# Patient Record
Sex: MALE | Race: WHITE | Employment: UNEMPLOYED | URBAN - NONMETROPOLITAN AREA
[De-identification: names, ages, dates, MRNs, and addresses within clinical notes are randomized per-mention and may not be internally consistent; named-entity substitution may affect disease eponyms.]

---
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
## 2024-09-26 ENCOUNTER — OFFICE VISIT (OUTPATIENT)
Dept: FAMILY MEDICINE | Facility: OTHER | Age: 19
End: 2024-09-26
Payer: COMMERCIAL

## 2024-09-26 VITALS
SYSTOLIC BLOOD PRESSURE: 108 MMHG | OXYGEN SATURATION: 99 % | HEIGHT: 68 IN | HEART RATE: 73 BPM | TEMPERATURE: 97.9 F | BODY MASS INDEX: 20.88 KG/M2 | WEIGHT: 137.8 LBS | RESPIRATION RATE: 18 BRPM | DIASTOLIC BLOOD PRESSURE: 68 MMHG

## 2024-09-26 DIAGNOSIS — J02.9 SORE THROAT: ICD-10-CM

## 2024-09-26 DIAGNOSIS — R05.2 SUBACUTE COUGH: ICD-10-CM

## 2024-09-26 DIAGNOSIS — J06.9 VIRAL URI WITH COUGH: Primary | ICD-10-CM

## 2024-09-26 LAB — GROUP A STREP BY PCR: NOT DETECTED

## 2024-09-26 PROCEDURE — 87651 STREP A DNA AMP PROBE: CPT | Mod: ZL

## 2024-09-26 PROCEDURE — 99203 OFFICE O/P NEW LOW 30 MIN: CPT

## 2024-09-26 PROCEDURE — G0463 HOSPITAL OUTPT CLINIC VISIT: HCPCS

## 2024-09-26 RX ORDER — BENZONATATE 100 MG/1
100 CAPSULE ORAL 3 TIMES DAILY PRN
Qty: 30 CAPSULE | Refills: 0 | Status: SHIPPED | OUTPATIENT
Start: 2024-09-26

## 2024-09-26 ASSESSMENT — PAIN SCALES - GENERAL: PAINLEVEL: NO PAIN (0)

## 2024-09-26 NOTE — PROGRESS NOTES
ASSESSMENT/PLAN:    I have reviewed the nursing notes.  I have reviewed the findings, diagnosis, plan and need for follow up with the patient.    1. Viral URI with cough  2. Sore throat  3. Subacute cough  - benzonatate (TESSALON) 100 MG capsule; Take 1 capsule (100 mg) by mouth 3 times daily as needed for cough.  Dispense: 30 capsule; Refill: 0  - Group A Streptococcus PCR Throat Swab    Patient presents with upper respiratory symptoms that are improving.  Patient's vitals are stable and he appears nontoxic.  Strep test was negative. Discussed with patient that symptoms and exam are consistent with viral illness.  Discussed that symptomatic treatment of cough is appropriate but not with antibiotics.  Will treat patient's cough with Tessalon Perles as needed.  Discussed symptomatic treatment - Encouraged fluids, salt water gargles, honey, elevation, humidifier, sinus rinse/netti pot, lozenges, tea, topical vapor rub, popsicles, rest, etc. May use over-the-counter Tylenol or ibuprofen PRN.    Discussed warning signs/symptoms indicative of need to f/u    Follow up if symptoms persist or worsen or concerns    I explained my diagnostic considerations and recommendations to the patient, who voiced understanding and agreement with the treatment plan. All questions were answered. We discussed potential side effects of any prescribed or recommended therapies, as well as expectations for response to treatments.    Chencho Costa, SANTOS CNP  9/26/2024  11:31 AM    HPI:    Yuan Miller is a 19 year old male accompanied by his sister who presents [facility] for concerns of URI symptoms    URI, x 3 weeks    Symptoms:  YES: +  fevers or chills. Fever, highest reported temperature: 99.9 F  YES: +  sore throat/pharyngitis/tonsillitis.   YES: +  allergy/URI Symptoms  YES: +  muffled sounds/change in hearing  YES: +  sensation of fullness in ear(s)  No ringing in ears/tinnitus  No balance changes  No dizziness  No  "congestion (head/nasal/chest)  YES: +  cough/productive cough  No post nasal drip   No headache - had but resolved  No sinus pain/pressure  No myalgias - had but resolved  No otalgia  No rash  Activity Level Changes: No  Appetite/Liquid Intake Changes: No  Changes to Bowel Habits: No  Changes to Bladder Habits: No  Additional Symptoms to Report: No  History of similar symptoms: No  Prior workup: No    Treatments tried: Fluids and Rest    Site of exposure: not known.  Type of exposure: not known    Other Pertinent History: none    Allergies: NKA    PCP: Melvin    History reviewed. No pertinent past medical history.  History reviewed. No pertinent surgical history.  Social History     Tobacco Use    Smoking status: Never    Smokeless tobacco: Never   Substance Use Topics    Alcohol use: Never     Current Outpatient Medications   Medication Sig Dispense Refill    buPROPion (WELLBUTRIN XL) 150 MG 24 hr tablet Take 1 tablet (150 mg) by mouth every morning (Patient not taking: Reported on 9/26/2024)      FLUoxetine (PROZAC) 20 MG capsule Take 1 capsule (20 mg) by mouth daily (Patient not taking: Reported on 9/26/2024)      methylphenidate (CONCERTA) 54 MG CR tablet Take 1 tablet (54 mg) by mouth every morning (Patient not taking: Reported on 9/26/2024)       No Known Allergies  Past medical history, past surgical history, current medications and allergies reviewed and accurate to the best of my knowledge.      ROS:  Refer to HPI    /68 (BP Location: Left arm, Patient Position: Sitting, Cuff Size: Adult Regular)   Pulse 73   Temp 97.9  F (36.6  C) (Temporal)   Resp 18   Ht 1.715 m (5' 7.5\")   Wt 62.5 kg (137 lb 12.8 oz)   SpO2 99%   BMI 21.26 kg/m      EXAM:  General Appearance: Well appearing 19 year old male, appropriate appearance for age. No acute distress   Ears: Left TM intact, translucent with bony landmarks appreciated, no erythema, mild effusion and bulging, no purulence.  Right TM intact, " translucent with bony landmarks appreciated, no erythema, mild effusion and bulging, no purulence.  Left auditory canal clear.  Right auditory canal clear.  Normal external ears, non tender.  Eyes: conjunctivae normal without erythema or irritation, corneas clear, no drainage or crusting, no eyelid swelling, pupils equal   Oropharynx: moist mucous membranes, posterior pharynx without erythema, tonsils symmetric and 1+, no erythema, no exudates or petechiae, no post nasal drip seen, no trismus, voice clear.    Sinuses:  No sinus tenderness upon palpation of the frontal or maxillary sinuses  Nose:  Bilateral nares: no erythema, no edema, no drainage or congestion   Neck: supple without adenopathy  Respiratory: normal chest wall and respirations.  Normal effort.  Clear to auscultation bilaterally, no wheezing, crackles or rhonchi.  No increased work of breathing.  No cough appreciated.  Cardiac: RRR with no murmurs  Musculoskeletal:  Equal movement of bilateral upper extremities.  Equal movement of bilateral lower extremities.  Normal gait.    Dermatological: no rashes noted of exposed skin  Neuro: Alert and oriented to person, place, and time.    Psychological: normal affect, alert, oriented, and pleasant.     Labs:  Results for orders placed or performed in visit on 09/26/24   Group A Streptococcus PCR Throat Swab     Status: Normal    Specimen: Throat; Swab   Result Value Ref Range    Group A strep by PCR Not Detected Not Detected    Narrative    The Xpert Xpress Strep A test, performed on the [employer], is a rapid, qualitative in vitro diagnostic test for the detection of Streptococcus pyogenes (Group A ß-hemolytic Streptococcus, Strep A) in throat swab specimens from patients with signs and symptoms of pharyngitis. The Xpert Xpress Strep A test can be used as an aid in the diagnosis of Group A Streptococcal pharyngitis. The assay is not intended to monitor treatment for Group A Streptococcus  infections. The Xpert Xpress Strep A test utilizes an automated real-time polymerase chain reaction (PCR) to detect Streptococcus pyogenes DNA.

## 2024-09-26 NOTE — PROGRESS NOTES
"Chief Complaint   Patient presents with    Cough    Throat Problem     [facility] concerns of a cough, dry throat and having a time hard hearing. Patient states his cough has been going on for about 3 weeks. Patient states that he has been having a hard time hearing for about 2 days.       Initial /68 (BP Location: Left arm, Patient Position: Sitting, Cuff Size: Adult Regular)   Pulse 73   Temp 97.9  F (36.6  C) (Temporal)   Resp 18   Ht 1.715 m (5' 7.5\")   Wt 62.5 kg (137 lb 12.8 oz)   SpO2 99%   BMI 21.26 kg/m   Estimated body mass index is 21.26 kg/m  as calculated from the following:    Height as of this encounter: 1.715 m (5' 7.5\").    Weight as of this encounter: 62.5 kg (137 lb 12.8 oz).     FOOD SECURITY SCREENING QUESTIONS:    The next two questions are to help us understand your food security.  If you are feeling you need any assistance in this area, we have resources available to support you today.    Hunger Vital Signs:  Within the past 12 months we worried whether our food would run out before we got money to buy more. Never  Within the past 12 months the food we bought just didn't last and we didn't have money to get more. Never      Samuel Bruno   "

## (undated) RX ORDER — LIDOCAINE HYDROCHLORIDE 10 MG/ML
INJECTION, SOLUTION INFILTRATION; PERINEURAL
Status: DISPENSED
Start: 2020-06-11

## (undated) RX ORDER — LIDOCAINE HYDROCHLORIDE 10 MG/ML
INJECTION, SOLUTION INFILTRATION; PERINEURAL
Status: DISPENSED
Start: 2019-09-19

## (undated) RX ORDER — NEOMYCIN/BACITRACIN/POLYMYXINB 3.5-400-5K
OINTMENT (GRAM) TOPICAL
Status: DISPENSED
Start: 2019-09-19